# Patient Record
Sex: MALE | Race: WHITE
[De-identification: names, ages, dates, MRNs, and addresses within clinical notes are randomized per-mention and may not be internally consistent; named-entity substitution may affect disease eponyms.]

---

## 2021-08-05 ENCOUNTER — HOSPITAL ENCOUNTER (OUTPATIENT)
Dept: HOSPITAL 61 - PF | Age: 66
End: 2021-08-05
Attending: FAMILY MEDICINE
Payer: COMMERCIAL

## 2021-08-05 DIAGNOSIS — M21.6X1: ICD-10-CM

## 2021-08-05 DIAGNOSIS — M19.171: Primary | ICD-10-CM

## 2021-08-05 DIAGNOSIS — M76.891: ICD-10-CM

## 2021-08-05 DIAGNOSIS — M25.762: ICD-10-CM

## 2021-08-05 DIAGNOSIS — M25.761: ICD-10-CM

## 2021-08-05 DIAGNOSIS — M76.892: ICD-10-CM

## 2021-08-05 DIAGNOSIS — M17.12: ICD-10-CM

## 2021-08-05 DIAGNOSIS — M25.462: ICD-10-CM

## 2021-08-05 DIAGNOSIS — M25.862: ICD-10-CM

## 2021-08-05 DIAGNOSIS — R60.9: ICD-10-CM

## 2021-08-05 PROCEDURE — 73600 X-RAY EXAM OF ANKLE: CPT

## 2021-08-05 PROCEDURE — 94010 BREATHING CAPACITY TEST: CPT

## 2021-08-05 NOTE — RAD
EXAM: Bilateral ankles, 2 views.



HISTORY: Arthritis.



COMPARISON: None.



FINDINGS: 



Left ankle: 2 views of the left ankle are obtained. There is no fracture, dislocation or subluxation.
 The ankle mortise is intact. There is no osteochondral lesion. There are incidental vascular calcifi
cations.



Right ankle: 2 views of the right ankle are obtained. There is near complete loss of the subtalar roby
nt space with associated subchondral sclerosis, subchondral cyst formation and bony remodeling. There
 is also deformity of the medial talar dome likely due to an osteochondral lesion. There is deformity
 of the posterior lower chest which may be due to a partially nonunited fracture. This may be superim
posed on degenerative subchondral sclerosis and subchondral cyst formation. There is a small benign o
sseous excrescence along the lateral aspect of the lateral malleolus. There is soft tissue edema. The
re is a tiny ossicle along the anterior talus.



IMPRESSION: 

1. 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
odeling. This can be seen with posttraumatic osteoarthritis. There is also deformity of the right med
ial talar dome, the appearance of which favors an osteochondral lesion with superimposed osteoarthrit
is and there is lucency along the articular aspect of the right posterior malleolus which may be due 
to remote trauma.

2. No acute finding or significant osteoarthritis involving the left ankle.



Electronically signed by: Ellie Francis MD (8/5/2021 12:17 PM) [id number]

## 2021-08-05 NOTE — RAD
EXAM: Bilateral knees, 2 views.



HISTORY: Arthritis.



COMPARISON: None.



FINDINGS: 2 views of both knees are obtained. There is moderate left greater than right medial compar
tment and mild left lateral compartment joint space narrowing. There is also moderate left and mild r
ight patellofemoral compartment spurring and left greater than right medial and lateral compartment s
ubchondral sclerosis and spurring. There is degenerative subchondral cyst along the lateral left tibi
al plateau. There is bony remodeling involving the left medial compartment and left greater than righ
t genu varus. There is a moderate left knee effusion. There are bilateral intra-articular osteophytes
.



IMPRESSION: 

1. Moderate medial and patellofemoral compartment predominant osteoarthritis of left knee with medial
 compartment bony remodeling, genu varus and a moderate joint effusion.

2. Mild medial compartment predominant osteoarthritis of the right knee with genu varus. 



Electronically signed by: Ellie Francis MD (8/5/2021 10:58 AM) [id number]